# Patient Record
Sex: FEMALE | Race: WHITE | NOT HISPANIC OR LATINO
[De-identification: names, ages, dates, MRNs, and addresses within clinical notes are randomized per-mention and may not be internally consistent; named-entity substitution may affect disease eponyms.]

---

## 2018-12-19 ENCOUNTER — RECORDS - HEALTHEAST (OUTPATIENT)
Dept: ADMINISTRATIVE | Facility: OTHER | Age: 38
End: 2018-12-19

## 2018-12-28 ENCOUNTER — AMBULATORY - HEALTHEAST (OUTPATIENT)
Dept: ADMINISTRATIVE | Facility: CLINIC | Age: 38
End: 2018-12-28

## 2018-12-28 DIAGNOSIS — K80.61 CALCULUS OF GALLBLADDER AND BILE DUCT WITH CHOLECYSTITIS WITH OBSTRUCTION, UNSPECIFIED CHOLECYSTITIS ACUITY: ICD-10-CM

## 2019-01-15 ENCOUNTER — OFFICE VISIT - HEALTHEAST (OUTPATIENT)
Dept: SURGERY | Facility: CLINIC | Age: 39
End: 2019-01-15

## 2019-01-15 ENCOUNTER — COMMUNICATION - HEALTHEAST (OUTPATIENT)
Dept: TELEHEALTH | Facility: CLINIC | Age: 39
End: 2019-01-15

## 2019-01-15 DIAGNOSIS — K80.20 CALCULUS OF GALLBLADDER WITHOUT CHOLECYSTITIS WITHOUT OBSTRUCTION: ICD-10-CM

## 2019-01-15 DIAGNOSIS — R59.0 RETROPERITONEAL LYMPHADENOPATHY: ICD-10-CM

## 2019-01-15 ASSESSMENT — MIFFLIN-ST. JEOR: SCORE: 1588.02

## 2019-02-01 ENCOUNTER — COMMUNICATION - HEALTHEAST (OUTPATIENT)
Dept: ONCOLOGY | Facility: CLINIC | Age: 39
End: 2019-02-01

## 2021-05-28 ENCOUNTER — RECORDS - HEALTHEAST (OUTPATIENT)
Dept: ADMINISTRATIVE | Facility: CLINIC | Age: 41
End: 2021-05-28

## 2021-06-02 VITALS — HEIGHT: 66 IN | BODY MASS INDEX: 32.47 KG/M2 | WEIGHT: 202 LBS

## 2021-06-23 NOTE — PROGRESS NOTES
HPI:  Patient presents for evaluation of abdominal pain. The pain is located in the epigastric to right upper quadrant with radiation into her back.  Sometimes it starts as back pain and then radiates to the front.  Not so much related to meals but does wake her up a lot in the middle the night.  Used to happen just intermittently but has become much more constant to the point where the last couple weeks she is afraid to eat anything.  She has changed to a very low-fat diet.  She denies any fevers.  She specifically denies any night sweats.  She denies any significant weight loss other than recently she is lost some weight because she is not been eating as much trying to avoid fatty food.    Please see Chart Review for PMH, medication list, allergies, FH and social history.  Past Medical History:   Diagnosis Date     Hypertension      Past Surgical History:   Procedure Laterality Date     ADENOIDECTOMY           Current Outpatient Medications:      amLODIPine (NORVASC) 5 MG tablet, Take 5 mg by mouth daily., Disp: , Rfl:     Allergies not on file      Social History     Socioeconomic History     Marital status: Single     Spouse name: None     Number of children: None     Years of education: None     Highest education level: None   Social Needs     Financial resource strain: None     Food insecurity - worry: None     Food insecurity - inability: None     Transportation needs - medical: None     Transportation needs - non-medical: None   Occupational History     None   Tobacco Use     Smoking status: Never Smoker     Smokeless tobacco: Never Used   Substance and Sexual Activity     Alcohol use: Yes     Frequency: Monthly or less     Drug use: No     Sexual activity: None   Other Topics Concern     None   Social History Narrative     None         A 12 point comprehensive review of systems was negative except as noted.    Physical Exam:  BP (!) 163/100 (Patient Site: Left Arm, Patient Position: Sitting, Cuff Size:  "Adult Regular)   Pulse 81   Ht 5' 6\" (1.676 m)   Wt 202 lb (91.6 kg)   SpO2 95%   BMI 32.60 kg/m      General:alert, appears stated age and cooperative  Eyes: negative  Lungs: clear to auscultation bilaterally  CV:regular rate and rhythm, S1, S2 normal, no murmur, click, rub or gallop  Abdomen:soft, non-tender; bowel sounds normal; no masses,  no organomegaly  Neuro: Grossly normal    Studies:  No results found for: WBC, HGB, HCT, MCV, PLT  No results found for: ALT, AST, GGT, ALKPHOS, BILITOT    Reviewed a CT scan of her abdomen as well as ultrasound of the abdomen.  These do confirm cholelithiasis but on the CT scan they also see significant retroperitoneal and mesenteric lymphadenopathy worrisome for lymphoma.        Impression:  Cholelithiasis with likely biliary colic.  However, the patient also has findings on her CT scan concerning for lymphoma.  Unfortunately, she had no idea of this.  So I explained this all to her.  I spent the vast majority of our visit talking about her potential for lymphoma rather than her gallbladder.  Right now I think there is concern for lymphoma is the bigger factor.  It is impossible to know whether the pain and discomfort she is having is from that or from her gallstones.  I suspect it is from her gallstones and at some point her gallbladder needs to come out, however I think diagnosing and treating a possible lymphoma take precedent.  She is already had all of her imaging within the health [facility] and I think she should continue to get her workup there.  I told her to contact the physician office that she was going to meet this Friday and let them know that she is aware of her diagnosis and ask if they can facilitate getting her set up for a percutaneous CT-guided needle biopsy.  If that comes back positive then obviously that treatment takes precedent.  If that biopsy comes back negative then I would recommend that we proceed with laparoscopic cholecystectomy and " also a mesenteric lymph node biopsy at the same time.  Laparoscopic lymph node biopsies can be a little difficult I think if they can do it with a percutaneous needle that would be best.  She also incidentally is scheduled for a colonoscopy tomorrow.  I told her she should proceed with that.  Plan:  She will contact her physician's office and get set up for a CT-guided biopsy of her retroperitoneal lymph nodes.  If she clearly has lymphoma then I think that treatment should take precedent.  If that biopsy is negative then she is to contact me and I will set her up for laparoscopic cholecystectomy with a mesenteric lymph node biopsy.  If her diagnosis is lymphoma, then there should be some consideration to doing the cholecystectomy before they start treatment so that she does not develop acute cholecystitis while neutropenic.  We could re-discuss going ahead with that at the same time she gets a port and if she is undoubtedly going to need that if this is a lymphoma.

## 2021-06-23 NOTE — TELEPHONE ENCOUNTER
Christiana called as she has been newly diagnosed with follicular lymphoma.  She said she was having gallbladder problems and had a CT scan.  She went to see Dr. Zendejas, bringing her outside CT in with her, and it was Dr. Zendejas that shared with her that she does have gallstones, but there were also areas that were suspicious for lymphoma.  She said she did see a provider at [facility], but is looking for a second opinion as she left the appt with more questions and a general uneasy feeling.  She is thinking of coming to .  We talked about the benefits of a second opinion.  I offered to assist her with scheduling, but she lives in [place], so I encouraged her to call her insurance and make sure she can come see a provider at  [facility], .  I have given her my contact information and encouraged her to call me back if she is able to come here and wishes to schedule an appt.  She agrees.